# Patient Record
(demographics unavailable — no encounter records)

---

## 2025-01-22 NOTE — PLAN
[FreeTextEntry1] : [facility] pap sent advised colonoscopy pt has mammo ref from her Internist stool guaiac neg today n/v 1 yr or PRN explained to pt if the pap is abnormal she will need futher work up examining this pt extremely difficult  pt is morbidly obese.  if procedures needed they may need to be done in [place]

## 2025-01-22 NOTE — HISTORY OF PRESENT ILLNESS
[FreeTextEntry1] : here for [facility] denies any vaginal bleeding has hx of HPV pap last year +HPV with MARICRUZ, pt refused colpo and EMB at that time has appt for mammo next wk last colonoscopy 10 yrs ago menopausal for yrs

## 2025-01-22 NOTE — PHYSICAL EXAM
[Chaperone Present] : A chaperone was present in the examining room during all aspects of the physical examination [[id number]] : A chaperone was present during the pelvic exam. [Soft] : soft [Non-tender] : non-tender [Non-distended] : non-distended [No Lesions] : no lesions [No Mass] : no mass [Oriented x3] : oriented x3 [Examination Of The Breasts] : a normal appearance [No Masses] : no breast masses were palpable [Labia Majora] : normal [Labia Minora] : normal [Normal] : normal [Uterine Adnexae] : normal

## 2025-02-19 NOTE — ASSESSMENT
[FreeTextEntry1] : 60F current smoker PMHx  fibroids, Class II Obesity (BMI 36.95), pre-DM, HTN, HLD, hx CVA (>10 yrs ago, with left sided deficits) presenting for colonoscopy screening evaluation.  #CRC screening Negative FOBT in Jan 2025  No FMHx CRC, average risk screening Previous colonoscopies (x2) with no polyps No changes in bowel habits, bloody or black BMs, abdominal pain  -Will obtain outside colonoscopy report 1-2 yrs ago from Dr Stu Burger to review. Pending results/prep score, will determine need for repeat colonoscopy -Reviewed outside BW results  #Elevated ALT  #Class II Obesity (BMI 36.95) ALT 38 (2/3/25) (H) FIB4 0.81, advanced fibrosis excluded -Continue to monitor